# Patient Record
Sex: FEMALE | Race: ASIAN | NOT HISPANIC OR LATINO
[De-identification: names, ages, dates, MRNs, and addresses within clinical notes are randomized per-mention and may not be internally consistent; named-entity substitution may affect disease eponyms.]

---

## 2017-08-14 PROBLEM — H90.3 SENSORY HEARING LOSS, BILATERAL: Status: ACTIVE | Noted: 2017-08-14

## 2017-08-14 PROBLEM — H69.80 EUSTACHIAN TUBE DYSFUNCTION: Status: ACTIVE | Noted: 2017-08-14

## 2017-08-14 PROBLEM — H69.90 EUSTACHIAN TUBE DYSFUNCTION: Status: ACTIVE | Noted: 2017-08-14

## 2020-03-03 ENCOUNTER — TELEPHONE (OUTPATIENT)
Dept: SCHEDULING | Age: 65
End: 2020-03-03

## 2020-03-05 ENCOUNTER — OFFICE VISIT (OUTPATIENT)
Dept: SPORTS MEDICINE | Age: 65
End: 2020-03-05

## 2020-03-05 ENCOUNTER — IMAGING SERVICES (OUTPATIENT)
Dept: GENERAL RADIOLOGY | Age: 65
End: 2020-03-05
Attending: FAMILY MEDICINE

## 2020-03-05 VITALS
DIASTOLIC BLOOD PRESSURE: 74 MMHG | HEIGHT: 65 IN | SYSTOLIC BLOOD PRESSURE: 118 MMHG | BODY MASS INDEX: 21.16 KG/M2 | WEIGHT: 127 LBS | HEART RATE: 72 BPM

## 2020-03-05 DIAGNOSIS — M75.42 IMPINGEMENT SYNDROME OF LEFT SHOULDER: ICD-10-CM

## 2020-03-05 DIAGNOSIS — M25.512 ACUTE PAIN OF LEFT SHOULDER: Primary | ICD-10-CM

## 2020-03-05 DIAGNOSIS — K21.00 GASTROESOPHAGEAL REFLUX DISEASE WITH ESOPHAGITIS: ICD-10-CM

## 2020-03-05 DIAGNOSIS — M06.9 RHEUMATOID ARTHRITIS, INVOLVING UNSPECIFIED SITE, UNSPECIFIED RHEUMATOID FACTOR PRESENCE: ICD-10-CM

## 2020-03-05 DIAGNOSIS — M25.512 LEFT SHOULDER PAIN, UNSPECIFIED CHRONICITY: ICD-10-CM

## 2020-03-05 DIAGNOSIS — E78.2 MIXED HYPERLIPIDEMIA: ICD-10-CM

## 2020-03-05 DIAGNOSIS — M12.812 ROTATOR CUFF ARTHROPATHY OF LEFT SHOULDER: ICD-10-CM

## 2020-03-05 PROBLEM — H69.90 EUSTACHIAN TUBE DYSFUNCTION: Status: ACTIVE | Noted: 2017-08-14

## 2020-03-05 PROBLEM — E03.9 ACQUIRED HYPOTHYROIDISM: Status: ACTIVE | Noted: 2018-12-13

## 2020-03-05 PROBLEM — E55.9 VITAMIN D DEFICIENCY: Status: ACTIVE | Noted: 2018-12-13

## 2020-03-05 PROBLEM — H90.3 SENSORY HEARING LOSS, BILATERAL: Status: ACTIVE | Noted: 2017-08-14

## 2020-03-05 PROCEDURE — 99204 OFFICE O/P NEW MOD 45 MIN: CPT | Performed by: FAMILY MEDICINE

## 2020-03-05 PROCEDURE — 73030 X-RAY EXAM OF SHOULDER: CPT | Performed by: RADIOLOGY

## 2020-03-05 RX ORDER — MECLIZINE HYDROCHLORIDE 25 MG/1
25 TABLET ORAL PRN
COMMUNITY

## 2020-03-05 RX ORDER — LEVOTHYROXINE SODIUM 0.07 MG/1
TABLET ORAL
COMMUNITY
Start: 2014-11-21

## 2020-03-05 RX ORDER — FLUTICASONE PROPIONATE 50 MCG
1 SPRAY, SUSPENSION (ML) NASAL PRN
COMMUNITY
Start: 2017-11-06

## 2020-03-05 RX ORDER — ACETAMINOPHEN 160 MG
2000 TABLET,DISINTEGRATING ORAL
COMMUNITY

## 2020-03-05 RX ORDER — OMEPRAZOLE 20 MG/1
CAPSULE, DELAYED RELEASE ORAL
Refills: 3 | COMMUNITY
Start: 2020-03-01

## 2020-03-05 RX ORDER — MELOXICAM 7.5 MG/1
7.5 TABLET ORAL DAILY
Qty: 10 TABLET | Refills: 0 | Status: SHIPPED | OUTPATIENT
Start: 2020-03-05 | End: 2020-03-15

## 2020-03-10 ENCOUNTER — OFFICE VISIT (OUTPATIENT)
Dept: PHYSICAL THERAPY | Age: 65
End: 2020-03-10
Attending: FAMILY MEDICINE

## 2020-03-10 DIAGNOSIS — M25.512 ACUTE PAIN OF LEFT SHOULDER: Primary | ICD-10-CM

## 2020-03-10 PROCEDURE — 97161 PT EVAL LOW COMPLEX 20 MIN: CPT | Performed by: PHYSICAL THERAPIST

## 2020-03-10 PROCEDURE — 97112 NEUROMUSCULAR REEDUCATION: CPT | Performed by: PHYSICAL THERAPIST

## 2020-03-10 ASSESSMENT — ENCOUNTER SYMPTOMS
ALLEVIATING FACTORS: AVOIDING MOVEMENT IN INVOLVED AREA
PAIN SCALE AT LOWEST: 1
PAIN SCALE AT HIGHEST: 6
QUALITY: TIGHT
ALLEVIATING FACTORS: PRESCRIPTION MEDICATIONS
QUALITY: SORE
ALLEVIATING FACTORS: TOPICAL AGENTS/PATCH
QUALITY: ACHE
PAIN SEVERITY NOW: 2
SUBJECTIVE PAIN PROGRESSION: WORSENING

## 2020-03-12 ENCOUNTER — OFFICE VISIT (OUTPATIENT)
Dept: PHYSICAL THERAPY | Age: 65
End: 2020-03-12

## 2020-03-12 DIAGNOSIS — M25.512 ACUTE PAIN OF LEFT SHOULDER: ICD-10-CM

## 2020-03-12 PROCEDURE — G0283 ELEC STIM OTHER THAN WOUND: HCPCS | Performed by: PHYSICAL THERAPIST

## 2020-03-12 PROCEDURE — 97112 NEUROMUSCULAR REEDUCATION: CPT | Performed by: PHYSICAL THERAPIST

## 2020-03-12 PROCEDURE — 97010 HOT OR COLD PACKS THERAPY: CPT | Performed by: PHYSICAL THERAPIST

## 2020-03-16 ENCOUNTER — OFFICE VISIT (OUTPATIENT)
Dept: PHYSICAL THERAPY | Age: 65
End: 2020-03-16

## 2020-03-16 DIAGNOSIS — M25.512 ACUTE PAIN OF LEFT SHOULDER: ICD-10-CM

## 2020-03-16 PROCEDURE — 97010 HOT OR COLD PACKS THERAPY: CPT | Performed by: PHYSICAL THERAPIST

## 2020-03-16 PROCEDURE — 97112 NEUROMUSCULAR REEDUCATION: CPT | Performed by: PHYSICAL THERAPIST

## 2020-03-16 PROCEDURE — 97140 MANUAL THERAPY 1/> REGIONS: CPT | Performed by: PHYSICAL THERAPIST

## 2020-03-16 PROCEDURE — G0283 ELEC STIM OTHER THAN WOUND: HCPCS | Performed by: PHYSICAL THERAPIST

## 2020-03-18 ENCOUNTER — APPOINTMENT (OUTPATIENT)
Dept: PHYSICAL THERAPY | Age: 65
End: 2020-03-18

## 2020-03-18 ENCOUNTER — TELEPHONE (OUTPATIENT)
Dept: PHYSICAL THERAPY | Age: 65
End: 2020-03-18

## 2020-03-18 ENCOUNTER — TELEPHONE (OUTPATIENT)
Dept: LAB | Age: 65
End: 2020-03-18

## 2020-03-23 ENCOUNTER — TELEPHONE (OUTPATIENT)
Dept: PHYSICAL THERAPY | Age: 65
End: 2020-03-23

## 2020-03-31 ENCOUNTER — TELEPHONE (OUTPATIENT)
Dept: SPORTS MEDICINE | Age: 65
End: 2020-03-31

## 2020-06-12 ENCOUNTER — TELEPHONE (OUTPATIENT)
Dept: PHYSICAL THERAPY | Age: 65
End: 2020-06-12

## 2020-06-30 ENCOUNTER — OFFICE VISIT (OUTPATIENT)
Dept: PHYSICAL THERAPY | Age: 65
End: 2020-06-30

## 2020-06-30 DIAGNOSIS — M25.512 ACUTE PAIN OF LEFT SHOULDER: ICD-10-CM

## 2020-06-30 PROCEDURE — 97140 MANUAL THERAPY 1/> REGIONS: CPT | Performed by: PHYSICAL THERAPIST

## 2020-06-30 PROCEDURE — 97112 NEUROMUSCULAR REEDUCATION: CPT | Performed by: PHYSICAL THERAPIST

## 2020-06-30 ASSESSMENT — ENCOUNTER SYMPTOMS
PAIN SCALE AT HIGHEST: 4
PAIN SEVERITY NOW: 1
PAIN SCALE AT LOWEST: 0

## 2020-07-02 ENCOUNTER — APPOINTMENT (OUTPATIENT)
Dept: PHYSICAL THERAPY | Age: 65
End: 2020-07-02

## 2020-07-07 ENCOUNTER — APPOINTMENT (OUTPATIENT)
Dept: PHYSICAL THERAPY | Age: 65
End: 2020-07-07

## 2020-07-09 ENCOUNTER — APPOINTMENT (OUTPATIENT)
Dept: PHYSICAL THERAPY | Age: 65
End: 2020-07-09

## 2020-07-16 ENCOUNTER — APPOINTMENT (OUTPATIENT)
Dept: PHYSICAL THERAPY | Age: 65
End: 2020-07-16

## 2020-07-23 ENCOUNTER — APPOINTMENT (OUTPATIENT)
Dept: PHYSICAL THERAPY | Age: 65
End: 2020-07-23

## 2021-03-15 ENCOUNTER — TRANSCRIBE ORDERS (OUTPATIENT)
Dept: SCHEDULING | Age: 66
End: 2021-03-15

## 2021-03-15 DIAGNOSIS — E78.00 HYPERCHOLESTEREMIA: Primary | ICD-10-CM

## 2021-03-24 ENCOUNTER — TELEPHONE (OUTPATIENT)
Dept: RADIOLOGY | Facility: HOSPITAL | Age: 66
End: 2021-03-24

## 2021-03-25 ENCOUNTER — HOSPITAL ENCOUNTER (OUTPATIENT)
Dept: RADIOLOGY | Facility: HOSPITAL | Age: 66
Discharge: HOME | End: 2021-03-25
Attending: RADIOLOGY

## 2021-03-25 DIAGNOSIS — E78.00 HYPERCHOLESTEREMIA: ICD-10-CM

## 2021-03-25 PROCEDURE — 75571 CT HRT W/O DYE W/CA TEST: CPT

## 2024-11-20 ENCOUNTER — OFFICE VISIT (OUTPATIENT)
Dept: AUDIOLOGY | Facility: CLINIC | Age: 69
End: 2024-11-20

## 2024-11-20 ENCOUNTER — OFFICE VISIT (OUTPATIENT)
Dept: OTOLARYNGOLOGY | Facility: CLINIC | Age: 69
End: 2024-11-20
Payer: COMMERCIAL

## 2024-11-20 VITALS — TEMPERATURE: 97 F | WEIGHT: 123 LBS | RESPIRATION RATE: 18 BRPM | OXYGEN SATURATION: 98 %

## 2024-11-20 DIAGNOSIS — H90.3 SENSORINEURAL HEARING LOSS (SNHL) OF BOTH EARS: Primary | ICD-10-CM

## 2024-11-20 DIAGNOSIS — H93.13 BILATERAL TINNITUS: ICD-10-CM

## 2024-11-20 DIAGNOSIS — H90.3 SENSORINEURAL HEARING LOSS (SNHL), BILATERAL: ICD-10-CM

## 2024-11-20 DIAGNOSIS — R42 DIZZINESS: Primary | ICD-10-CM

## 2024-11-20 PROCEDURE — 99203 OFFICE O/P NEW LOW 30 MIN: CPT | Performed by: OTOLARYNGOLOGY

## 2024-11-20 PROCEDURE — 1160F RVW MEDS BY RX/DR IN RCRD: CPT | Performed by: OTOLARYNGOLOGY

## 2024-11-20 PROCEDURE — 92557 COMPREHENSIVE HEARING TEST: CPT | Performed by: AUDIOLOGIST

## 2024-11-20 PROCEDURE — 1159F MED LIST DOCD IN RCRD: CPT | Performed by: OTOLARYNGOLOGY

## 2024-11-20 PROCEDURE — 92567 TYMPANOMETRY: CPT | Performed by: AUDIOLOGIST

## 2024-11-20 NOTE — PROGRESS NOTES
NEW PATIENT PROGRESS NOTE  OTOLOGY/OTOLARYNGOLOGY    REF MD:  No referring provider defined for this encounter.    PCP: Maria L Fonseca DO    CHIEF COMPLAINT:    Chief Complaint   Patient presents with    Dizziness     Patient is here for dizziness reports liquid on her ears.       HISTORY OF PRESENT ILLNESS: Hope Barrientos is a 69 year old female who presents for evaluation of vertigo. She had a hearing test in 2017. On the 12th had to go to [facility] and she went to the airport where she felt sleepy and she felt something strange happening. She felt this happening starting 2 months ago. Right now if she walks fast she feels it and if she tilts her head she feels it. Her ears do feel closed. When dizziness first happenedat airport she couldn't focus had blurred vision and couldn't get up for a while. Lasted 4 hours. Went to pcp this mrhyun and hey aid this could be becuas eof migraines. Feeling ringing since yesterday in both ears.    PAST MEDICAL HISTORY:  History reviewed. No pertinent past medical history.    PAST SURGICAL HISTORY:  History reviewed. No pertinent surgical history.    Medications Ordered Prior to Encounter[1]    Allergies: Allergies[2]    SOCIAL HISTORY:    Social History     Tobacco Use    Smoking status: Never     Passive exposure: Never    Smokeless tobacco: Never   Substance Use Topics    Alcohol use: Never       History reviewed. No pertinent family history.    REVIEW OF SYSTEMS:   Positives are in bold  Neuro: Headache, facial weakness, facial numbness, neck pain, vertigo  ENT: Hearing change, tinnitus, otorrhea, otalgia, aural fullness, ear pressure, vertigo, imbalance  Sinus pressure, rhinorrhea, congestion, facial pain, jaw pain, dysphagia, odynophagia, sore throat, voice changes, shortness of breath    EXAMINATION:  I washed my hands with an alcohol-based hand gel prior to examination  Constitutional:   --Vitals: Temperature 97.2 °F (36.2 °C), temperature source Oral, resp. rate 18,  weight 123 lb (55.8 kg), SpO2 98%.  General: no apparent distress, well-developed, conversant  Respiratory: No stridor, stertor or increased work of breathing  ENT:  --Nose: no external nasal deformity, anterior rhinoscopy: Septum midline, no inferior turbinate hypertrophy, mucosa healthy, no rhinorrhea  --OC/OP: No trismus. No masses or lesions noted over the gingiva, buccal mucosa, tongue, FOM, hard/soft palate, tonsillar pillars, posterior pharyngeal wall. FOM/BOT are soft.   --Neck: No palpable cervical lymphadenopathy, no thyromegaly, no masses or lesions over the bilateral submandibular or parotid glands  --Ear: (bilateral ears were examined under binocular microscopy)  Right ear microscopic exam:  Pinna: Normal, no lesions or masses.  Mastoid: Nontender on palpation.   External auditory canal: Clear, no masses or lesions.  Tympanic membrane: Intact, no lesions, normal landmarks.  Middle ear: Aerated.    Left ear microscopic exam:  Pinna: Normal, no lesions or masses.  Mastoid: Nontender on palpation.   External auditory canal: Clear, no masses or lesions.  Tympanic membrane: Intact, no lesions, normal landmarks.  Middle ear: Aerated.  Latest Audiogram Result (Hz) Exam performed: 11/20/2024 2:42 PM Last edited by Alexa Silveira Au.D on 11/20/2024 2:55 PM        125 250  1500 2000 3000 4000 6000 8000    Right air:  25 20  25  30  40 65 80    Left air:  25 20  30  40  40 60 70    Right mastoid bone:   25  25  30  40         Reliability:  Good    Transducer:  Inserts    Technique:  Conventional Audiometry    Comments:            Latest Speech Audiometry  Last edited by Alexa Silveira Au.D on 11/20/2024 2:55 PM       Ear Method PTA SAT SRT [facility] Test/list Score (%) Intensity Mask/noise Notes    right recorded   30   NU-6 100 65      left recorded   30   NU-6 88 65                    Latest Tympanogram Result       Probe Tone (Hz): Unknown Exam performed: 11/20/2024 2:42 PM Last edited by  Alexa Silveira Au.D on 11/20/2024 2:55 PM      Tympanograms  These were drawn by a user, not generated from device data      Right Ear Left Ear                     Right Ear Left Ear    Tympanogram type: Type A Type A    Canal volume (mL): 1.3 1.2    Peak pressure (daPa): -5 -34    Peak amplitude (mL): 0.25 0.6    Tympanogram width (daPa):        Comments:                     ASSESSMENT/PLAN:  Hope Barrientos is a 69 year old female with     ICD-10-CM   1. Dizziness  R42   2. Sensorineural hearing loss (SNHL), bilateral  H90.3   3. Bilateral tinnitus  H93.13        IMPRESSION:  Bilateral SNHL - symmetric   Vertigo - unspecified. Likely vestibular neuritis vs vestibular migraine. Could be both.     PLAN:  -Audiogram reviewed   -Ok to continue supportive medication - meclizine/zofran as needed for symptoms  -Recommend follow-up at patient's convenience for vestibular examination. Patient will need to be off of meclizine for 48 hours prior    Situation reviewed with the patient in detail.    Og Ferrell MD  Otology/Otolaryngology  [address] [address]  Phone [phone]  Fax [phone]      I have personally performed the services described in this documentation. All medical record entries made by the scribe were at my direction and in my presence. I have reviewed the chart and agree that the medical record reflects my personal performance and is accurate and complete.             [1]   Current Outpatient Medications on File Prior to Visit   Medication Sig Dispense Refill    FLUTICASONE PROPIONATE 50 MCG/ACT Nasal Suspension SHAKE LIQUID AND USE 2 SPRAYS IN EACH NOSTRIL DAILY 3 Bottle 2    Levothyroxine Sodium 75 MCG Oral Tab TK 1 T PO QAM  0    Pantoprazole Sodium 40 MG Oral Tab EC   0     No current facility-administered medications on file prior to visit.   [2]   Allergies  Allergen Reactions    Sulfa Antibiotics UNKNOWN

## 2024-11-24 NOTE — PROGRESS NOTES
NEW PATIENT PROGRESS NOTE  OTOLOGY/OTOLARYNGOLOGY    REF MD:  No referring provider defined for this encounter.    PCP: Maria L Fonseca DO    CHIEF COMPLAINT:    No chief complaint on file.    LAST VISIT 11/20/2024  IMPRESSION:  *** ?  PLAN:  _________________________________________________________________________________  Interval history:     HISTORY OF PRESENT ILLNESS: Hope Barrientos is a 69 year old female who presents for evaluation of vertigo. She had a hearing tet in 2017. Rfom the 12th hse had to go to [facility] and she went to the airport where she felt sleepy and she felt something strange happening. She felt this happening like6 weeks back or 2 motnhs ago. Rightn ow if she walks fast she feels it and if she tilts her head she feels it.her eras do feel closed. When dizziness first happenedat airport she couldn't focus had blurred vision and couldn't get up for a while. Lasted 4 hours. Went to pcp this mrhyun and hey aid this colud be becuas eof migraines. Feeling ringing since yesterday       PAST MEDICAL HISTORY:  No past medical history on file.    PAST SURGICAL HISTORY:  No past surgical history on file.    Medications Ordered Prior to Encounter[1]    Allergies: Allergies[2]    SOCIAL HISTORY:    Social History     Tobacco Use    Smoking status: Never     Passive exposure: Never    Smokeless tobacco: Never   Substance Use Topics    Alcohol use: Never       FAMILY HISTORY: Denies known family history of hearing loss, tinnitus, vertigo, or migraine.  Denies known family history of head and neck cancer, thyroid cancer, bleeding disorders.     REVIEW OF SYSTEMS:   Positives are in bold  Neuro: Headache, facial weakness, facial numbness, neck pain, vertigo  ENT: Hearing change, tinnitus, otorrhea, otalgia, aural fullness, ear pressure, vertigo, imbalance  Sinus pressure, rhinorrhea, congestion, facial pain, jaw pain, dysphagia, odynophagia, sore throat, voice changes, shortness of  breath    EXAMINATION:  I washed my hands with an alcohol-based hand gel prior to examination  Constitutional:   --Vitals: There were no vitals taken for this visit.  General: no apparent distress, well-developed, conversant  Psych: affect pleasant and appropriate for age, alert and oriented  Neuro: Cranial nerves: EOMI, Facial sensation intact to touch, palate elevates midline, tongue protrudes midline, shoulder shrug intact bilateral, facial movement normal bilateral  Respiratory: No stridor, stertor or increased work of breathing  ENT:  --Nose: no external nasal deformity, anterior rhinoscopy: Septum midline, no inferior turbinate hypertrophy, mucosa healthy, no rhinorrhea  --OC/OP: No trismus. No masses or lesions noted over the gingiva, buccal mucosa, tongue, FOM, hard/soft palate, tonsillar pillars, posterior pharyngeal wall. Tonsils are *** and soft. FOM/BOT are soft.   --Neck: No palpable cervical lymphadenopathy, no thyromegaly, no masses or lesions over the bilateral submandibular or parotid glands  --Ear: (bilateral ears were examined under binocular microscopy)  Right ear microscopic exam:  Pinna: Normal, no lesions or masses.  Mastoid: Nontender on palpation.   External auditory canal: Clear, no masses or lesions.  Tympanic membrane: Intact, no lesions, normal landmarks.  Middle ear: Aerated.    Left ear microscopic exam:  Pinna: Normal, no lesions or masses.  Mastoid: Nontender on palpation.   External auditory canal: Clear, no masses or lesions.  Tympanic membrane: Intact, no lesions, normal landmarks.  Middle ear: Aerated.  Latest Audiogram Result (Hz) Exam performed: 11/20/2024 2:42 PM Last edited by Alexa Silveira Au.D on 11/20/2024 2:55 PM        125 250  1500 2000 3000 4000 6000 8000    Right air:  25 20  25  30  40 65 80    Left air:  25 20  30  40  40 60 70    Right mastoid bone:   25  25  30  40         Reliability:  Good    Transducer:  Inserts    Technique:  Conventional  Audiometry    Comments:            Latest Speech Audiometry  Last edited by Alexa Silveira Au.D on 11/20/2024 2:55 PM       Ear Method PTA SAT SRT [facility] Test/list Score (%) Intensity Mask/noise Notes    right recorded   30   NU-6 100 65      left recorded   30   NU-6 88 65                    Latest Tympanogram Result       Probe Tone (Hz): Unknown Exam performed: 11/20/2024 2:42 PM Last edited by Alexa Silveira Au.D on 11/20/2024 2:55 PM      Tympanograms  These were drawn by a user, not generated from device data      Right Ear Left Ear                     Right Ear Left Ear    Tympanogram type: Type A Type A    Canal volume (mL): 1.3 1.2    Peak pressure (daPa): -5 -34    Peak amplitude (mL): 0.25 0.6    Tympanogram width (daPa):        Comments:                     ASSESSMENT/PLAN:  Hope Barrientos is a 69 year old female with   No diagnosis found.     IMPRESSION:    PLAN:    Situation reviewed with the patient in detail.    Og Ferrell MD  Otology/Otolaryngology  [address] [place] [address]  Phone [phone]  Fax [phone]    NEW PATIENT PROGRESS NOTE  OTOLOGY/OTOLARYNGOLOGY    REF MD:  No referring provider defined for this encounter.    PCP: Maria L Fonseca DO    CHIEF COMPLAINT:    No chief complaint on file.      HISTORY OF PRESENT ILLNESS: Hope Barrientos is a 69 year old female who presents for evaluation of vertigo. She had a hearing tet in 2017. Rfom the 12th hse had to go to [facility] and she went to the airport where she felt sleepy and she felt something strange happening. She felt this happening like6 weeks back or 2 motnhs ago. Rightn ow if she walks fast she feels it and if she tilts her head she feels it.her eras do feel closed. When dizziness first happenedat airport she couldn't focus had blurred vision and couldn't get up for a while. Lasted 4 hours. Went to pcp this mrhyun and hey aid this colud be becuas  eof migraines. Feeling ringing since yesterday       PAST MEDICAL HISTORY:  No past medical history on file.    PAST SURGICAL HISTORY:  No past surgical history on file.    Medications Ordered Prior to Encounter[3]    Allergies: Allergies[4]    SOCIAL HISTORY:    Social History     Tobacco Use    Smoking status: Never     Passive exposure: Never    Smokeless tobacco: Never   Substance Use Topics    Alcohol use: Never       FAMILY HISTORY: Denies known family history of hearing loss, tinnitus, vertigo, or migraine.  Denies known family history of head and neck cancer, thyroid cancer, bleeding disorders.     REVIEW OF SYSTEMS:   Positives are in bold  Neuro: Headache, facial weakness, facial numbness, neck pain, vertigo  ENT: Hearing change, tinnitus, otorrhea, otalgia, aural fullness, ear pressure, vertigo, imbalance  Sinus pressure, rhinorrhea, congestion, facial pain, jaw pain, dysphagia, odynophagia, sore throat, voice changes, shortness of breath    EXAMINATION:  I washed my hands with an alcohol-based hand gel prior to examination  Constitutional:   --Vitals: There were no vitals taken for this visit.  General: no apparent distress, well-developed, conversant  Psych: affect pleasant and appropriate for age, alert and oriented  Neuro: Cranial nerves: EOMI, Facial sensation intact to touch, palate elevates midline, tongue protrudes midline, shoulder shrug intact bilateral, facial movement normal bilateral  Respiratory: No stridor, stertor or increased work of breathing  ENT:  --Nose: no external nasal deformity, anterior rhinoscopy: Septum midline, no inferior turbinate hypertrophy, mucosa healthy, no rhinorrhea  --OC/OP: No trismus. No masses or lesions noted over the gingiva, buccal mucosa, tongue, FOM, hard/soft palate, tonsillar pillars, posterior pharyngeal wall. Tonsils are *** and soft. FOM/BOT are soft.   --Neck: No palpable cervical lymphadenopathy, no thyromegaly, no masses or lesions over the bilateral  submandibular or parotid glands  --Ear: (bilateral ears were examined under binocular microscopy)  Right ear microscopic exam:  Pinna: Normal, no lesions or masses.  Mastoid: Nontender on palpation.   External auditory canal: Clear, no masses or lesions.  Tympanic membrane: Intact, no lesions, normal landmarks.  Middle ear: Aerated.    Left ear microscopic exam:  Pinna: Normal, no lesions or masses.  Mastoid: Nontender on palpation.   External auditory canal: Clear, no masses or lesions.  Tympanic membrane: Intact, no lesions, normal landmarks.  Middle ear: Aerated.  Latest Audiogram Result (Hz) Exam performed: 11/20/2024 2:42 PM Last edited by Alexa Silveira Au.D on 11/20/2024 2:55 PM        125 250  1500 2000 3000 4000 6000 8000    Right air:  25 20  25  30  40 65 80    Left air:  25 20  30  40  40 60 70    Right mastoid bone:   25  25  30  40         Reliability:  Good    Transducer:  Inserts    Technique:  Conventional Audiometry    Comments:            Latest Speech Audiometry  Last edited by Alexa Silveira Au.D on 11/20/2024 2:55 PM       Ear Method PTA SAT SRT [facility] Test/list Score (%) Intensity Mask/noise Notes    right recorded   30   NU-6 100 65      left recorded   30   NU-6 88 65                    Latest Tympanogram Result       Probe Tone (Hz): Unknown Exam performed: 11/20/2024 2:42 PM Last edited by Alexa Silveira Au.D on 11/20/2024 2:55 PM      Tympanograms  These were drawn by a user, not generated from device data      Right Ear Left Ear                     Right Ear Left Ear    Tympanogram type: Type A Type A    Canal volume (mL): 1.3 1.2    Peak pressure (daPa): -5 -34    Peak amplitude (mL): 0.25 0.6    Tympanogram width (daPa):        Comments:                     ASSESSMENT/PLAN:  Hope Barrientos is a 69 year old female with   No diagnosis found.     IMPRESSION:    PLAN:    Situation reviewed with the patient in detail.    Og Ferrell MD   Otology/Otolaryngology  [facility] [address]  [place] [address]  Phone [phone]  Fax [phone]           [1]   Current Outpatient Medications on File Prior to Visit   Medication Sig Dispense Refill    FLUTICASONE PROPIONATE 50 MCG/ACT Nasal Suspension SHAKE LIQUID AND USE 2 SPRAYS IN EACH NOSTRIL DAILY 3 Bottle 2    Levothyroxine Sodium 75 MCG Oral Tab TK 1 T PO QAM  0    Pantoprazole Sodium 40 MG Oral Tab EC   0     No current facility-administered medications on file prior to visit.   [2]   Allergies  Allergen Reactions    Sulfa Antibiotics UNKNOWN   [3]   Current Outpatient Medications on File Prior to Visit   Medication Sig Dispense Refill    FLUTICASONE PROPIONATE 50 MCG/ACT Nasal Suspension SHAKE LIQUID AND USE 2 SPRAYS IN EACH NOSTRIL DAILY 3 Bottle 2    Levothyroxine Sodium 75 MCG Oral Tab TK 1 T PO QAM  0    Pantoprazole Sodium 40 MG Oral Tab EC   0     No current facility-administered medications on file prior to visit.   [4]   Allergies  Allergen Reactions    Sulfa Antibiotics UNKNOWN

## 2024-11-25 ENCOUNTER — OFFICE VISIT (OUTPATIENT)
Dept: OTOLARYNGOLOGY | Facility: CLINIC | Age: 69
End: 2024-11-25
Payer: COMMERCIAL

## 2024-11-25 DIAGNOSIS — G43.809 VESTIBULAR MIGRAINE: ICD-10-CM

## 2024-11-25 DIAGNOSIS — R42 VERTIGO: ICD-10-CM

## 2024-11-25 DIAGNOSIS — H90.3 SENSORINEURAL HEARING LOSS (SNHL), BILATERAL: ICD-10-CM

## 2024-11-25 DIAGNOSIS — H81.10 BENIGN PAROXYSMAL POSITIONAL VERTIGO, UNSPECIFIED LATERALITY: Primary | ICD-10-CM

## 2024-11-25 PROCEDURE — 99214 OFFICE O/P EST MOD 30 MIN: CPT | Performed by: OTOLARYNGOLOGY

## 2024-11-25 PROCEDURE — 1159F MED LIST DOCD IN RCRD: CPT | Performed by: OTOLARYNGOLOGY

## 2024-11-25 PROCEDURE — 1160F RVW MEDS BY RX/DR IN RCRD: CPT | Performed by: OTOLARYNGOLOGY

## 2024-11-25 RX ORDER — NORTRIPTYLINE HYDROCHLORIDE 10 MG/1
20 CAPSULE ORAL NIGHTLY
Qty: 60 CAPSULE | Refills: 1 | Status: SHIPPED | OUTPATIENT
Start: 2024-11-25

## 2024-11-25 NOTE — PROGRESS NOTES
PATIENT PROGRESS NOTE  OTOLOGY/OTOLARYNGOLOGY    REF MD:  No referring provider defined for this encounter.    PCP: Maria L Fonseca DO    CHIEF COMPLAINT:    Chief Complaint   Patient presents with    Follow - Up     Patient is here for follow up reports not improving reports blurry vision and nausea.     LAST VISIT 11/20/2024  IMPRESSION:  Bilateral SNHL - symmetric   Vertigo - unspecified. Likely vestibular neuritis vs vestibular migraine. Could be both.     PLAN:  -Audiogram reviewed   -Ok to continue supportive medication - meclizine/zofran as needed for symptoms  -Recommend follow-up at patient's convenience for vestibular examination. Patient will need to be off of meclizine for 48 hours prior  _______________________________________________________________________  Interval history: Patient is here for vestibular exam.     HISTORY OF PRESENT ILLNESS: Hope Barrientos is a 69 year old female who presents for evaluation of vertigo. She had a hearing test in 2017. On the 12th had to go to [place] and she went to the airport where she felt sleepy and she felt something strange happening. She felt this happening starting 2 months ago. Right now if she walks fast she feels it and if she tilts her head she feels it. Her ears do feel closed. When dizziness first happened at airport she couldn't focus had blurred vision and couldn't get up for a while. Lasted 4 hours. Went to the PCP this morning, and they mentioned that this could be due to migraines. I've been experiencing ringing in both ears since yesterday.    PAST MEDICAL HISTORY:  History reviewed. No pertinent past medical history.    PAST SURGICAL HISTORY:  History reviewed. No pertinent surgical history.    Medications Ordered Prior to Encounter[1]    Allergies: Allergies[2]    SOCIAL HISTORY:    Social History     Tobacco Use    Smoking status: Never     Passive exposure: Never    Smokeless tobacco: Never   Substance Use Topics    Alcohol use: Never        History reviewed. No pertinent family history.    REVIEW OF SYSTEMS:   Positives are in bold  Neuro: Headache, facial weakness, facial numbness, neck pain, vertigo  ENT: Hearing change, tinnitus, otorrhea, otalgia, aural fullness, ear pressure, vertigo, imbalance  Sinus pressure, rhinorrhea, congestion, facial pain, jaw pain, dysphagia, odynophagia, sore throat, voice changes, shortness of breath    EXAMINATION:  I washed my hands with an alcohol-based hand gel prior to examination  Constitutional:   --Vitals: There were no vitals taken for this visit.  General: no apparent distress, well-developed, conversant  Respiratory: No stridor, stertor or increased work of breathing  ENT:  --Nose: no external nasal deformity, anterior rhinoscopy: Septum midline, no inferior turbinate hypertrophy, mucosa healthy, no rhinorrhea  --OC/OP: No trismus. No masses or lesions noted over the gingiva, buccal mucosa, tongue, FOM, hard/soft palate, tonsillar pillars, posterior pharyngeal wall. FOM/BOT are soft.   --Neck: No palpable cervical lymphadenopathy, no thyromegaly, no masses or lesions over the bilateral submandibular or parotid glands  --Ear: (bilateral ears were examined under binocular microscopy)  Right ear microscopic exam:  Pinna: Normal, no lesions or masses.  Mastoid: Nontender on palpation.   External auditory canal: Clear, no masses or lesions.  Tympanic membrane: Intact, no lesions, normal landmarks.  Middle ear: Aerated.    Left ear microscopic exam:  Pinna: Normal, no lesions or masses.  Mastoid: Nontender on palpation.   External auditory canal: Clear, no masses or lesions.  Tympanic membrane: Intact, no lesions, normal landmarks.  Middle ear: Aerated.  Latest Audiogram Result (Hz) Exam performed: 11/20/2024 2:42 PM Last edited by Alexa Silveira Au.D on 11/20/2024 2:55 PM        125 250  1500 2000 3000 4000 6000 8000    Right air:  25 20  25  30  40 65 80    Left air:  25 20  30  40  40 60 70     Right mastoid bone:   25  25  30  40         Reliability:  Good    Transducer:  Inserts    Technique:  Conventional Audiometry    Comments:            Latest Speech Audiometry  Last edited by Alexa Silveira Au.D on 11/20/2024 2:55 PM       Ear Method PTA SAT SRT [facility] Test/list Score (%) Intensity Mask/noise Notes    right recorded   30   NU-6 100 65      left recorded   30   NU-6 88 65                    Latest Tympanogram Result       Probe Tone (Hz): Unknown Exam performed: 11/20/2024 2:42 PM Last edited by Alexa Silveira Au.D on 11/20/2024 2:55 PM      Tympanograms  These were drawn by a user, not generated from device data      Right Ear Left Ear                     Right Ear Left Ear    Tympanogram type: Type A Type A    Canal volume (mL): 1.3 1.2    Peak pressure (daPa): -5 -34    Peak amplitude (mL): 0.25 0.6    Tympanogram width (daPa):        Comments:                       Vestibular examination (11/25/2024):  No neutral gaze nystagmus  A few upbeats after headshake  Hallpike right: slight leftbeat nystagmus, Hallpike left: slight rightbeat nystagmus     ASSESSMENT/PLAN:  Hope Barrientos is a 69 year old female with     ICD-10-CM   1. Benign paroxysmal positional vertigo, unspecified laterality  H81.10   2. Vertigo  R42   3. Sensorineural hearing loss (SNHL), bilateral  H90.3   4. Vestibular migraine  G43.809          IMPRESSION:  Bilateral SNHL - symmetric   Vertigo - appears to be more likely caused by vestibular migraine, as the vestibular exam is not consistent vestibular neuritis.   Low-grade BPPV (lateral cupulolithiasis)     PLAN:  -Start Nortriptyline 20 mg, take 10 mg for 1 week, then increase to 20 mg thereafter.  -Possible side effects include fatigue, constipation, dry mouth, vivid dreams.  More rarely patients can experience increased blood pressure or tachycardia.  At this low-dose the medication can be stopped without a taper.  Most patients take the medication before bed as  it can help them sleep well.  However if the patient feels that it keeps them awake at night they can take it during the daytime.   -Ok to continue supportive medication - meclizine/zofran as needed for symptoms  -Lifestyle changes including stress reduction, migraine diet (caffeine cessation), sleep hygiene, increasing hydration, regular meals discussed  -Patient education: Migraine: More than a Headache; this packet includes information regarding medication side effects  -Symptom diary  -Follow-up in 6 weeks     Situation reviewed with the patient in detail.    Attention: This note has been scribed by Katelynn Bazzi under the supervision of Og Ferrell MD.     Og Ferrell MD  Otology/Otolaryngology  [address] [place] [id number]  Phone [phone]  Fax [phone]      I have personally performed the services described in this documentation. All medical record entries made by the scribe were at my direction and in my presence. I have reviewed the chart and agree that the medical record reflects my personal performance and is accurate and complete.             [1]   Current Outpatient Medications on File Prior to Visit   Medication Sig Dispense Refill    FLUTICASONE PROPIONATE 50 MCG/ACT Nasal Suspension SHAKE LIQUID AND USE 2 SPRAYS IN EACH NOSTRIL DAILY 3 Bottle 2    Levothyroxine Sodium 75 MCG Oral Tab TK 1 T PO QAM  0    Pantoprazole Sodium 40 MG Oral Tab EC   0     No current facility-administered medications on file prior to visit.   [2]   Allergies  Allergen Reactions    Sulfa Antibiotics UNKNOWN

## 2024-11-26 ENCOUNTER — PATIENT MESSAGE (OUTPATIENT)
Dept: OTOLARYNGOLOGY | Facility: CLINIC | Age: 69
End: 2024-11-26

## 2024-11-26 NOTE — TELEPHONE ENCOUNTER
Patient is calling to follow up on a prescription. She sent a message about 3 hours ago regarding this. Please contact patient.

## 2024-11-27 ENCOUNTER — PATIENT MESSAGE (OUTPATIENT)
Dept: OTOLARYNGOLOGY | Facility: CLINIC | Age: 69
End: 2024-11-27

## 2024-12-06 ENCOUNTER — TELEPHONE (OUTPATIENT)
Dept: OTOLARYNGOLOGY | Facility: CLINIC | Age: 69
End: 2024-12-06

## 2024-12-06 NOTE — TELEPHONE ENCOUNTER
Pt's message 12-5-24.  Pt is taking rx. Nortriptyline.  Advised not to take sudafed.  What else can pt take for a cold.  Sore throat. Temperature.  Headache.    Please call

## 2024-12-06 NOTE — TELEPHONE ENCOUNTER
Per patient, has sore throat, fever of 101 F., took tylenol at 1330, nasal congestion, cough, headache and body aches, is on nortriptyline and was told not to take sudafed.  Consulted with Dr. Bloom in office, and per md patient can take sudafed from her standpoint, unless patient was told by another physician that he should not take it.  Patient can reach out to his PCP for further evaluation depending on how he feels, can also go to [facility] for evaluation because if has COVID or influenza can be evaluated whether paxlovid or tamiflu would be appropriate based on his history. To manage symptoms including To rest, use tylenol for fever reduction and body aches and increase fluid intake. Patient verbalized understanding

## 2025-01-06 ENCOUNTER — OFFICE VISIT (OUTPATIENT)
Dept: OTOLARYNGOLOGY | Facility: CLINIC | Age: 70
End: 2025-01-06

## 2025-01-06 VITALS — WEIGHT: 123 LBS

## 2025-01-06 DIAGNOSIS — R42 DIZZINESS: Primary | ICD-10-CM

## 2025-01-06 DIAGNOSIS — R42 VERTIGO: ICD-10-CM

## 2025-01-06 DIAGNOSIS — G43.809 VESTIBULAR MIGRAINE: ICD-10-CM

## 2025-01-06 DIAGNOSIS — H90.3 SENSORINEURAL HEARING LOSS (SNHL), BILATERAL: ICD-10-CM

## 2025-01-06 PROCEDURE — 99214 OFFICE O/P EST MOD 30 MIN: CPT | Performed by: OTOLARYNGOLOGY

## 2025-01-06 PROCEDURE — 1160F RVW MEDS BY RX/DR IN RCRD: CPT | Performed by: OTOLARYNGOLOGY

## 2025-01-06 PROCEDURE — 1159F MED LIST DOCD IN RCRD: CPT | Performed by: OTOLARYNGOLOGY

## 2025-01-06 NOTE — PROGRESS NOTES
PATIENT PROGRESS NOTE  OTOLOGY/OTOLARYNGOLOGY    REF MD:  No referring provider defined for this encounter.    PCP: MAYA KOCH MD    CHIEF COMPLAINT:    Chief Complaint   Patient presents with    Follow - Up     Patient is here for follow up of 6 weeks patient reports little improvement      LAST VISIT 11/25/2024  IMPRESSION:  Bilateral SNHL - symmetric   Vertigo - appears to be more likely caused by vestibular migraine, as the vestibular exam is not consistent vestibular neuritis.   Low-grade BPPV (lateral cupulolithiasis)     PLAN:  -Start Nortriptyline 20 mg, take 10 mg for 1 week, then increase to 20 mg thereafter.  -Possible side effects include fatigue, constipation, dry mouth, vivid dreams.  More rarely patients can experience increased blood pressure or tachycardia.  At this low-dose the medication can be stopped without a taper.  Most patients take the medication before bed as it can help them sleep well.  However if the patient feels that it keeps them awake at night they can take it during the daytime.   -Ok to continue supportive medication - meclizine/zofran as needed for symptoms  -Lifestyle changes including stress reduction, migraine diet (caffeine cessation), sleep hygiene, increasing hydration, regular meals discussed  -Patient education: Migraine: More than a Headache; this packet includes information regarding medication side effects  -Symptom diary  -Follow-up in 6 weeks   ___________________________________________________________________________________________________  Interval history: Patient reports dizziness while walking side to side and experiences tinnitus. Patient is taking nortriptyline and meclizine daily but perceives no significant improvement in vertigo. She experiences a sensation of heaviness in her ears, which are also blocked and ringing. Recently, she was seen at [facility] and diagnosed with benign paroxysmal positional vertigo (BPPV) and started on vestibular  physical therapy and meclizine. Patient also had a COVID infection on 12/06/2024.    HISTORY OF PRESENT ILLNESS: Hope Barrientos is a 69 year old female who presents for evaluation of vertigo. She had a hearing test in 2017. On the 12th had to go to [facility] and she went to the airport where she felt sleepy and she felt something strange happening. She felt this happening starting 2 months ago. Right now if she walks fast she feels it and if she tilts her head she feels it. Her ears do feel closed. When dizziness first happened at airport she couldn't focus had blurred vision and couldn't get up for a while. Lasted 4 hours. Went to the PCP this morning, and they mentioned that this could be due to migraines. I've been experiencing ringing in both ears since yesterday.    PAST MEDICAL HISTORY:  History reviewed. No pertinent past medical history.    PAST SURGICAL HISTORY:  History reviewed. No pertinent surgical history.    Medications Ordered Prior to Encounter[1]    Allergies: Allergies[2]    SOCIAL HISTORY:    Social History     Tobacco Use    Smoking status: Never     Passive exposure: Never    Smokeless tobacco: Never   Substance Use Topics    Alcohol use: Never       History reviewed. No pertinent family history.    REVIEW OF SYSTEMS:   Positives are in bold  Neuro: Headache, facial weakness, facial numbness, neck pain, vertigo  ENT: Hearing change, tinnitus, otorrhea, otalgia, aural fullness, ear pressure, vertigo, imbalance  Sinus pressure, rhinorrhea, congestion, facial pain, jaw pain, dysphagia, odynophagia, sore throat, voice changes, shortness of breath    EXAMINATION:  I washed my hands with an alcohol-based hand gel prior to examination  Constitutional:   --Vitals: Weight 123 lb (55.8 kg).  General: no apparent distress, well-developed, conversant  Respiratory: No stridor, stertor or increased work of breathing  ENT:  --Nose: no external nasal deformity, anterior rhinoscopy: Septum midline, no inferior  turbinate hypertrophy, mucosa healthy, no rhinorrhea  --OC/OP: No trismus. No masses or lesions noted over the gingiva, buccal mucosa, tongue, FOM, hard/soft palate, tonsillar pillars, posterior pharyngeal wall. FOM/BOT are soft.   --Neck: No palpable cervical lymphadenopathy, no thyromegaly, no masses or lesions over the bilateral submandibular or parotid glands  --Ear: (bilateral ears were examined under binocular microscopy)  Right ear microscopic exam:  Pinna: Normal, no lesions or masses.  Mastoid: Nontender on palpation.   External auditory canal: Clear, no masses or lesions.  Tympanic membrane: Intact, no lesions, normal landmarks.  Middle ear: Aerated.    Left ear microscopic exam:  Pinna: Normal, no lesions or masses.  Mastoid: Nontender on palpation.   External auditory canal: Clear, no masses or lesions.  Tympanic membrane: Intact, no lesions, normal landmarks.  Middle ear: Aerated.    Latest Audiogram Result (Hz) Exam performed: 11/20/2024 2:42 PM Last edited by Alexa Silveira Au.D on 11/20/2024 2:55 PM        125 250  1500 2000 3000 4000 6000 8000    Right air:  25 20  25  30  40 65 80    Left air:  25 20  30  40  40 60 70    Right mastoid bone:   25  25  30  40         Reliability:  Good    Transducer:  Inserts    Technique:  Conventional Audiometry    Comments:            Latest Speech Audiometry  Last edited by Alexa Silveira Au.D on 11/20/2024 2:55 PM       Ear Method PTA SAT SRT [facility] Test/list Score (%) Intensity Mask/noise Notes    right recorded   30   NU-6 100 65      left recorded   30   NU-6 88 65                    Latest Tympanogram Result       Probe Tone (Hz): Unknown Exam performed: 11/20/2024 2:42 PM Last edited by Alexa Silveira Au.D on 11/20/2024 2:55 PM      Tympanograms  These were drawn by a user, not generated from device data      Right Ear Left Ear                     Right Ear Left Ear    Tympanogram type: Type A Type A    Canal volume (mL): 1.3 1.2     Peak pressure (daPa): -5 -34    Peak amplitude (mL): 0.25 0.6    Tympanogram width (daPa):        Comments:                    Latest Audiogram and Tympanogram Result Text  Last edited by Alexa Silveira Au.D on 11/20/2024  4:16 PM      Addendum      Mild SNHL at 250-4000Hz, sloping to a severe loss at 8000Hz bilaterally.     Thresholds are symmetric.   Word recognition scores are excellent for both ears.   Normal tympanograms bilaterally.     Follow up with Dr. Bloom.      Addended by Alexa Silveira Au.D on 11/20/2024  4:16 PM              Vestibular examination (11/25/2024):  No neutral gaze nystagmus  A few upbeats after headshake  Hallpike right: slight leftbeat nystagmus, Hallpike left: slight rightbeat nystagmus     ASSESSMENT/PLAN:  Hope Barrientos is a 69 year old female with     ICD-10-CM   1. Dizziness  R42   2. Sensorineural hearing loss (SNHL), bilateral  H90.3   3. Vertigo  R42   4. Vestibular migraine  G43.809            IMPRESSION:  Bilateral SNHL - symmetric   Vertigo - appears to be more likely caused by vestibular migraine, as the vestibular exam is not consistent vestibular neuritis.   Low-grade BPPV (lateral cupulolithiasis)     PLAN:  -Recommend VNG for further evaluation  -Continue nortriptyline for 1 week then discontinue  -Continue vestibular physical therapy   -Discontinue meclizine, discussed risks of daily use and interference with vestibular rehabilitation  -Lifestyle changes including stress reduction, migraine diet (caffeine cessation), sleep hygiene, increasing hydration, regular meals discussed  -Symptom diary  -Follow-up after VNG testing    Situation reviewed with the patient in detail.    Attention: This note has been scribed by Katelynn Bazzi under the supervision of Og Ferrell MD.     Og Ferrell MD  Otology/Otolaryngology  [address] [place] [address]  Phone [phone]  Fax [phone]       I have personally performed the services described in this documentation. All medical record entries made by the scribe were at my direction and in my presence. I have reviewed the chart and agree that the medical record reflects my personal performance and is accurate and complete.             [1]   Current Outpatient Medications on File Prior to Visit   Medication Sig Dispense Refill    nortriptyline 10 MG Oral Cap Take 2 capsules (20 mg total) by mouth nightly. Take 10mg nightly for 7 days then increase to 20mg nightly. 60 capsule 1    FLUTICASONE PROPIONATE 50 MCG/ACT Nasal Suspension SHAKE LIQUID AND USE 2 SPRAYS IN EACH NOSTRIL DAILY 3 Bottle 2    Levothyroxine Sodium 75 MCG Oral Tab TK 1 T PO QAM  0    Pantoprazole Sodium 40 MG Oral Tab EC   0     No current facility-administered medications on file prior to visit.   [2]   Allergies  Allergen Reactions    Sulfa Antibiotics UNKNOWN

## 2025-01-09 ENCOUNTER — TELEPHONE (OUTPATIENT)
Dept: OTOLARYNGOLOGY | Facility: CLINIC | Age: 70
End: 2025-01-09

## 2025-01-09 NOTE — TELEPHONE ENCOUNTER
Pt c/o eye pressure pain and Left side ear pressure pain that started yesterday. Pt is wondering if nortriptyline may be causing these symptoms as she has stopped a few of her medications to figure out if prescriptions are causing her issues. Pt denies difficulty hearing or discharge coming from ear. Pt has appointment with ophthalmologist on 01/13. Pt also wondering if she can see her office visit summary on 01/06, RN informed pt that physician needs to sign office visit and then it will show in her MyChart. RN informed Dr. Bloom. Per Dr. Bloom, medication was planned to discontinue after one week but if pt has concerns of possible side effect, she can stop medication. Dr. Bloom has also signed office visit. RN informed pt of Dr. Bloom's recommendations. Pt had no further questions or concerns, next scheduled appointment is on 03/04/2025.

## 2025-01-24 RX ORDER — NORTRIPTYLINE HYDROCHLORIDE 10 MG/1
CAPSULE ORAL
Qty: 60 CAPSULE | Refills: 1 | Status: SHIPPED | OUTPATIENT
Start: 2025-01-24

## 2025-01-24 NOTE — TELEPHONE ENCOUNTER
This refill request is being sent to the provider for the following reason:  []Patient has not had an appointment within the past 12 months but has made an appointment on: ___  [x]Medication is not within protocol - Nortriptyline  []Patient did not complete follow up recommendations  []Other: ___    Last office visit was:  1/6/25

## 2025-02-13 ENCOUNTER — OFFICE VISIT (OUTPATIENT)
Dept: AUDIOLOGY | Facility: CLINIC | Age: 70
End: 2025-02-13
Payer: COMMERCIAL

## 2025-02-13 DIAGNOSIS — R42 DIZZINESS: Primary | ICD-10-CM

## 2025-02-13 PROCEDURE — 1159F MED LIST DOCD IN RCRD: CPT | Performed by: AUDIOLOGIST

## 2025-02-13 PROCEDURE — 92537 CALORIC VSTBLR TEST W/REC: CPT | Performed by: AUDIOLOGIST

## 2025-02-13 PROCEDURE — 92540 BASIC VESTIBULAR EVALUATION: CPT | Performed by: AUDIOLOGIST

## 2025-02-13 NOTE — PROGRESS NOTES
Department Of Audiology  Videonystagmography (VNG) Report  Test Date: 2025  Referring Physician:  Og Bloom MD     Patient: Hope Barrientos  GE: [id number]  : 1955     History:   Hope Barrientos, 69 year old female, was seen today for VNG evaluation. Hope reports that she has not been dizzy since shortly after she saw Dr. Bloom in the office on 2025.  She reports no dizziness since stopping the medication Dr. Bloom gave her to take.   No reported headaches.     Spontaneous Nystagmus:   No significant spontaneous nystagmus was observed or recorded with vision or vision denied.     Gaze-Evoked Nystagmus:   No significant gaze nystagmus was recorded on right, left, upward or downward gaze with vision/fixation.      Oculomotor Testing (Central Test Battery):  Pendular Tracking: Normal smooth pursuit for both rightward and leftward moving targets at all test frequencies.   Saccadic Tracking: Normal velocity and latency for both rightward and leftward moving targets. Poor accuracy for both rightward and leftward moving targets.   Optokinetic Testing: Normal OPK's at slow (20 degrees/second) and symmetrical, but reduced gain at fast (40 degrees/second) target speeds.     Head Shake Test:  A right beating horizontal nystagmus of 3 degrees was recorded with vision denied post head shake.       Static Positional Tests:  Supine: right beating 3 degrees  Head right: right beating 6 degrees.  Head left: right beating 4 degrees  Right lateral: right beating 3 degrees  Left lateral: No nystagmus      Dynamic Positioning Tests - [place]-Hallpike Test (for posterior canal BPPV):   Right ear down:  Brief burst of right torsional nystagmus that fatigued was recorded in the right ear down position. Patient reported slight dizziness in this position.   Left ear down:    Within normal limits left.      Caloric Tests:  RC: 7 degrees/sec  Total SPV: 47 degrees per second   degrees/sec  Unilateral Weakness:  15% left ear weakness; non-pathologic  RW: 20 degrees/sec  Directional Preponderance: 32% stronger right beating.                                                             LW: 9 degrees/sec                 Fixation: WNL     Impression:  Abnormal VNG.   The abnormalities in the oculomotor test battery are a CNS finding.  The post head-shake and positional nystagmus are non localizing abnormalities.  Turner-Hallpike was positive in the right ear down position.   Directional preponderance is 32% (criterion for abnormality is >30%) and denotes a non-localizing lesion.      Caloric irrigations do not exhibit a significant unilateral weakness.      Recommendations:  Follow-up with referring provider, Og Bloom MD, with today's test findings.       Copies of today's testing were sent to scanning to be uploaded into the patient's EMR.           Ten Anderson.  Clinical Audiologist

## 2025-02-19 ENCOUNTER — OFFICE VISIT (OUTPATIENT)
Dept: OTOLARYNGOLOGY | Facility: CLINIC | Age: 70
End: 2025-02-19
Payer: COMMERCIAL

## 2025-02-19 ENCOUNTER — PATIENT MESSAGE (OUTPATIENT)
Dept: OTOLARYNGOLOGY | Facility: CLINIC | Age: 70
End: 2025-02-19

## 2025-02-19 DIAGNOSIS — H90.3 SENSORINEURAL HEARING LOSS (SNHL), BILATERAL: ICD-10-CM

## 2025-02-19 DIAGNOSIS — G43.809 VESTIBULAR MIGRAINE: ICD-10-CM

## 2025-02-19 DIAGNOSIS — H81.11 BENIGN PAROXYSMAL POSITIONAL VERTIGO OF RIGHT EAR: Primary | ICD-10-CM

## 2025-02-19 PROCEDURE — 99213 OFFICE O/P EST LOW 20 MIN: CPT | Performed by: OTOLARYNGOLOGY

## 2025-02-19 PROCEDURE — 1159F MED LIST DOCD IN RCRD: CPT | Performed by: OTOLARYNGOLOGY

## 2025-02-19 PROCEDURE — 1160F RVW MEDS BY RX/DR IN RCRD: CPT | Performed by: OTOLARYNGOLOGY

## 2025-02-19 NOTE — PROGRESS NOTES
PATIENT PROGRESS NOTE  OTOLOGY/OTOLARYNGOLOGY    REF MD:  No referring provider defined for this encounter.    PCP: MAYA KOCH MD    CHIEF COMPLAINT:    No chief complaint on file.    LAST VISIT 01/06/2025  IMPRESSION:  Bilateral SNHL - symmetric   Vertigo - appears to be more likely caused by vestibular migraine, as the vestibular exam is not consistent vestibular neuritis.   Low-grade BPPV (lateral cupulolithiasis)     PLAN:  -Recommend VNG for further evaluation  -Continue nortriptyline for 1 week then discontinue  -Continue vestibular physical therapy   -Discontinue meclizine, discussed risks of daily use and interference with vestibular rehabilitation  -Lifestyle changes including stress reduction, migraine diet (caffeine cessation), sleep hygiene, increasing hydration, regular meals discussed  -Symptom diary  -Follow-up after VNG testing  _________________________________________________________________________________    Interval history: Here to review VNG. She has allergies to flowers which triggered a headache and after that she went to a high building and she felt it. It was also bothersome.    HISTORY OF PRESENT ILLNESS: Hope Barrientos is a 69 year old female who presents for evaluation of vertigo. She had a hearing test in 2017. On the 12th had to go to [facility] and she went to the airport where she felt sleepy and she felt something strange happening. She felt this happening starting 2 months ago. Right now if she walks fast she feels it and if she tilts her head she feels it. Her ears do feel closed. When dizziness first happened at airport she couldn't focus had blurred vision and couldn't get up for a while. Lasted 4 hours. Went to the PCP this morning, and they mentioned that this could be due to migraines. I've been experiencing ringing in both ears since yesterday.    PAST MEDICAL HISTORY:  No past medical history on file.    PAST SURGICAL HISTORY:  No past surgical history on  file.    Medications Ordered Prior to Encounter[1]    Allergies: Allergies[2]    SOCIAL HISTORY:    Social History     Tobacco Use    Smoking status: Never     Passive exposure: Never    Smokeless tobacco: Never   Substance Use Topics    Alcohol use: Never       No family history on file.    REVIEW OF SYSTEMS:   Positives are in bold  Neuro: Headache, facial weakness, facial numbness, neck pain, vertigo  ENT: Hearing change, tinnitus, otorrhea, otalgia, aural fullness, ear pressure, vertigo, imbalance  Sinus pressure, rhinorrhea, congestion, facial pain, jaw pain, dysphagia, odynophagia, sore throat, voice changes, shortness of breath    EXAMINATION:  I washed my hands with an alcohol-based hand gel prior to examination  Constitutional:   --Vitals: There were no vitals taken for this visit.  General: no apparent distress, well-developed, conversant  Respiratory: No stridor, stertor or increased work of breathing  ENT:  --Nose: no external nasal deformity, anterior rhinoscopy: Septum midline, no inferior turbinate hypertrophy, mucosa healthy, no rhinorrhea  --OC/OP: No trismus. No masses or lesions noted over the gingiva, buccal mucosa, tongue, FOM, hard/soft palate, tonsillar pillars, posterior pharyngeal wall. FOM/BOT are soft.   --Neck: No palpable cervical lymphadenopathy, no thyromegaly, no masses or lesions over the bilateral submandibular or parotid glands  --Ear: (bilateral ears were examined under binocular microscopy)  Right ear microscopic exam:  Pinna: Normal, no lesions or masses.  Mastoid: Nontender on palpation.   External auditory canal: Clear, no masses or lesions.  Tympanic membrane: Intact, no lesions, normal landmarks.  Middle ear: Aerated.    Left ear microscopic exam:  Pinna: Normal, no lesions or masses.  Mastoid: Nontender on palpation.   External auditory canal: Clear, no masses or lesions.  Tympanic membrane: Intact, no lesions, normal landmarks.  Middle ear: Aerated.    Latest Audiogram  Result (Hz) Exam performed: 11/20/2024 2:42 PM Last edited by Alexa Silveira Au.D on 11/20/2024 2:55 PM        125 250  1500 2000 3000 4000 6000 8000    Right air:  25 20  25  30  40 65 80    Left air:  25 20  30  40  40 60 70    Right mastoid bone:   25  25  30  40         Reliability:  Good    Transducer:  Inserts    Technique:  Conventional Audiometry    Comments:            Latest Speech Audiometry  Last edited by Alexa Silveira Au.D on 11/20/2024 2:55 PM       Ear Method PTA SAT SRT [facility] Test/list Score (%) Intensity Mask/noise Notes    right recorded   30   NU-6 100 65      left recorded   30   NU-6 88 65                    Latest Tympanogram Result       Probe Tone (Hz): Unknown Exam performed: 11/20/2024 2:42 PM Last edited by Alexa Silveira Au.D on 11/20/2024 2:55 PM      Tympanograms  These were drawn by a user, not generated from device data      Right Ear Left Ear                     Right Ear Left Ear    Tympanogram type: Type A Type A    Canal volume (mL): 1.3 1.2    Peak pressure (daPa): -5 -34    Peak amplitude (mL): 0.25 0.6    Tympanogram width (daPa):        Comments:                    Latest Audiogram and Tympanogram Result Text  Last edited by Alexa Silveira Au.D on 11/20/2024  4:16 PM      Addendum      Mild SNHL at 250-4000Hz, sloping to a severe loss at 8000Hz bilaterally.     Thresholds are symmetric.   Word recognition scores are excellent for both ears.   Normal tympanograms bilaterally.     Follow up with Dr. Bloom.      Addended by Alexa Silveira Au.D on 11/20/2024  4:16 PM              Vestibular examination (11/25/2024):  No neutral gaze nystagmus  A few upbeats after headshake  Hallpike right: slight leftbeat nystagmus, Hallpike left: slight rightbeat nystagmus     VNG testing (02/13/2025)   Abnormal VNG.   The abnormalities in the oculomotor test battery are a CNS finding.  The post head-shake and positional nystagmus are non localizing  abnormalities.  Trey-Hallpike was positive in the right ear down position.   Directional preponderance is 32% (criterion for abnormality is >30%) and denotes a non-localizing lesion.       Caloric irrigations do not exhibit a significant unilateral weakness.    ASSESSMENT/PLAN:  Hope Barrientos is a 69 year old female with   No diagnosis found.           IMPRESSION:  Bilateral SNHL - symmetric   Vertigo - appears to be more likely caused by vestibular migraine, as the vestibular exam is not consistent vestibular neuritis.   Low-grade BPPV (lateral cupulolithiasis)     PLAN:  - Will get iron infusions as she has low iron to see if that will possibly help with low grade  dizziness as well.  - Starting vestibular therapy for her BPPV.  -Lifestyle changes including stress reduction, migraine diet (caffeine cessation), sleep hygiene, increasing hydration, regular meals discussed  -Symptom diary  -Follow-up after PT    Situation reviewed with the patient in detail.    Attention: This note has been scribed by Emi Kevin under the supervision of Og Ferrell MD.     Og Ferrell MD  Otology/Otolaryngology  [place] [id number]  Phone [phone]  Fax [phone]      I have personally performed the services described in this documentation. All medical record entries made by the scribe were at my direction and in my presence. I have reviewed the chart and agree that the medical record reflects my personal performance and is accurate and complete.             [1]   Current Outpatient Medications on File Prior to Visit   Medication Sig Dispense Refill    FLUTICASONE PROPIONATE 50 MCG/ACT Nasal Suspension SHAKE LIQUID AND USE 2 SPRAYS IN EACH NOSTRIL DAILY 3 Bottle 2    Levothyroxine Sodium 75 MCG Oral Tab TK 1 T PO QAM  0    Pantoprazole Sodium 40 MG Oral Tab EC   0    NORTRIPTYLINE 10 MG Oral Cap TAKE 10 MG(1 CAPSULE) BY MOUTH EVERY  NIGHT FOR 7 DAYS, THEN INCREASE TO 20 MG(2 CAPSULES) EVERY NIGHT 60 capsule 1     No current facility-administered medications on file prior to visit.   [2]   Allergies  Allergen Reactions    Sulfa Antibiotics UNKNOWN

## 2025-03-27 ENCOUNTER — APPOINTMENT (OUTPATIENT)
Dept: PHYSICAL THERAPY | Facility: HOSPITAL | Age: 70
End: 2025-03-27
Attending: OTOLARYNGOLOGY
Payer: MEDICARE

## 2025-04-03 ENCOUNTER — APPOINTMENT (OUTPATIENT)
Dept: PHYSICAL THERAPY | Facility: HOSPITAL | Age: 70
End: 2025-04-03
Attending: OTOLARYNGOLOGY
Payer: MEDICARE

## 2025-04-10 ENCOUNTER — APPOINTMENT (OUTPATIENT)
Dept: PHYSICAL THERAPY | Facility: HOSPITAL | Age: 70
End: 2025-04-10
Attending: OTOLARYNGOLOGY
Payer: MEDICARE

## 2025-04-17 ENCOUNTER — APPOINTMENT (OUTPATIENT)
Dept: PHYSICAL THERAPY | Facility: HOSPITAL | Age: 70
End: 2025-04-17
Attending: OTOLARYNGOLOGY
Payer: MEDICARE

## 2025-04-24 ENCOUNTER — APPOINTMENT (OUTPATIENT)
Dept: PHYSICAL THERAPY | Facility: HOSPITAL | Age: 70
End: 2025-04-24
Attending: OTOLARYNGOLOGY
Payer: MEDICARE

## 2025-05-01 ENCOUNTER — APPOINTMENT (OUTPATIENT)
Dept: PHYSICAL THERAPY | Facility: HOSPITAL | Age: 70
End: 2025-05-01
Attending: OTOLARYNGOLOGY
Payer: MEDICARE

## 2025-05-08 ENCOUNTER — APPOINTMENT (OUTPATIENT)
Dept: PHYSICAL THERAPY | Facility: HOSPITAL | Age: 70
End: 2025-05-08
Attending: OTOLARYNGOLOGY
Payer: MEDICARE

## 2025-05-12 ENCOUNTER — OFFICE VISIT (OUTPATIENT)
Dept: OTOLARYNGOLOGY | Facility: CLINIC | Age: 70
End: 2025-05-12

## 2025-05-12 DIAGNOSIS — G43.809 VESTIBULAR MIGRAINE: Primary | ICD-10-CM

## 2025-05-12 DIAGNOSIS — H90.3 SENSORINEURAL HEARING LOSS (SNHL), BILATERAL: ICD-10-CM

## 2025-05-12 PROCEDURE — 1159F MED LIST DOCD IN RCRD: CPT | Performed by: OTOLARYNGOLOGY

## 2025-05-12 PROCEDURE — 1160F RVW MEDS BY RX/DR IN RCRD: CPT | Performed by: OTOLARYNGOLOGY

## 2025-05-12 PROCEDURE — 99213 OFFICE O/P EST LOW 20 MIN: CPT | Performed by: OTOLARYNGOLOGY

## 2025-05-12 NOTE — PROGRESS NOTES
The following individual(s) verbally consented to be recorded using ambient AI listening technology and understand that they can each withdraw their consent to this listening technology at any point by asking the clinician to turn off or pause the recording:    Patient name: Hope Barrientos  Additional names:  Javi Barrientos

## 2025-05-15 ENCOUNTER — APPOINTMENT (OUTPATIENT)
Dept: PHYSICAL THERAPY | Facility: HOSPITAL | Age: 70
End: 2025-05-15
Attending: OTOLARYNGOLOGY
Payer: MEDICARE

## 2025-05-22 ENCOUNTER — APPOINTMENT (OUTPATIENT)
Dept: PHYSICAL THERAPY | Facility: HOSPITAL | Age: 70
End: 2025-05-22
Attending: OTOLARYNGOLOGY
Payer: MEDICARE

## 2025-05-29 ENCOUNTER — APPOINTMENT (OUTPATIENT)
Dept: PHYSICAL THERAPY | Facility: HOSPITAL | Age: 70
End: 2025-05-29
Attending: OTOLARYNGOLOGY
Payer: MEDICARE